# Patient Record
Sex: MALE | Race: WHITE | NOT HISPANIC OR LATINO | Employment: FULL TIME | URBAN - METROPOLITAN AREA
[De-identification: names, ages, dates, MRNs, and addresses within clinical notes are randomized per-mention and may not be internally consistent; named-entity substitution may affect disease eponyms.]

---

## 2022-05-13 ENCOUNTER — HOSPITAL ENCOUNTER (EMERGENCY)
Facility: HOSPITAL | Age: 22
Discharge: HOME | End: 2022-05-13
Attending: EMERGENCY MEDICINE
Payer: COMMERCIAL

## 2022-05-13 VITALS
RESPIRATION RATE: 16 BRPM | HEART RATE: 90 BPM | OXYGEN SATURATION: 99 % | DIASTOLIC BLOOD PRESSURE: 89 MMHG | WEIGHT: 188 LBS | TEMPERATURE: 99.1 F | SYSTOLIC BLOOD PRESSURE: 154 MMHG
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
## 2022-05-13 DIAGNOSIS — S71.151A DOG BITE OF RIGHT THIGH, INITIAL ENCOUNTER: Primary | ICD-10-CM

## 2022-05-13 PROCEDURE — 90471 IMMUNIZATION ADMIN: CPT

## 2022-05-13 PROCEDURE — 99283 EMERGENCY DEPT VISIT LOW MDM: CPT

## 2022-05-13 PROCEDURE — 96372 THER/PROPH/DIAG INJ SC/IM: CPT

## 2022-05-13 PROCEDURE — 90675 RABIES VACCINE IM: CPT | Performed by: PHYSICIAN ASSISTANT

## 2022-05-13 PROCEDURE — 90375 RABIES IG IM/SC: CPT | Performed by: PHYSICIAN ASSISTANT

## 2022-05-13 PROCEDURE — 99284 EMERGENCY DEPT VISIT MOD MDM: CPT | Performed by: PHYSICIAN ASSISTANT

## 2022-05-13 RX ADMIN — RABIES VIRUS STRAIN PM-1503-3M ANTIGEN (PROPIOLACTONE INACTIVATED) AND WATER 1 ML: KIT at 21:40

## 2022-05-13 RX ADMIN — RABIES IMMUNE GLOBULIN (HUMAN) 1710 UNITS: 300 INJECTION, SOLUTION INFILTRATION; INTRAMUSCULAR at 21:33

## 2022-05-14 NOTE — DISCHARGE INSTRUCTIONS
You will need to return to the ED to complete your rabies vaccines  Your rabies vaccine series dates:    Vaccine #2 (day 3): Monday, 5/16/22  Vaccine #3 (day 7): Friday, 5/20/22  Vaccine #4 (day 14): Friday, 5/27/22    Continue taking the antibiotics prescribed at urgent care  Return to the ER sooner with any signs of infection or fevers

## 2022-05-14 NOTE — ED PROVIDER NOTES
History  Chief Complaint   Patient presents with    Dog Bite     Pt states dog bite to R thigh from [place] yesterday; pt seen at urgent care told to come here to be treated for rabies  Bruising noted around the bite     19yo male with no significant past medical history presenting for evaluation of a dog bite that was sustained yesterday  He was picking up a car part in [place] at a [employer]  While picking this up, the [employer]	[place] at the property ran up and bit him in his right thigh  Patient was seen at urgent care after the incident and the wound was irrigated and cleaned with iodine  He reports that a dog bite form was filled out  He was started on antibiotics and advised to go to the ED for a rabies vaccine  Patient is unsure of the dog's vaccination status and does not know the owner of the dog and states the owner seemed "sketchy " He is otherwise asymptomatic  History provided by:  Patient   used: No    Dog Bite  Contact animal:  Dog  Location:  Leg  Leg injury location:  R upper leg  Time since incident:  1 day  Pain details:     Quality:  Aching    Severity:  Mild    Timing:  Constant    Progression:  Unchanged  Incident location:  Another residence  Animal's rabies vaccination status:  Unknown  Animal captured: Unknown  Tetanus status:  Unknown  Relieved by:  Nothing  Worsened by:  Nothing  Associated symptoms: no fever, no numbness and no rash        None       History reviewed  No pertinent past medical history  History reviewed  No pertinent surgical history  History reviewed  No pertinent family history  I have reviewed and agree with the history as documented  E-Cigarette/Vaping     E-Cigarette/Vaping Substances     Social History     Tobacco Use    Smoking status: Never Smoker    Smokeless tobacco: Never Used       Review of Systems   Constitutional: Negative for chills and fever  Eyes: Negative for discharge and redness     Musculoskeletal: Positive for myalgias  Negative for neck stiffness  Skin: Positive for wound  Negative for rash  Neurological: Negative for weakness and numbness  Psychiatric/Behavioral: Negative for confusion  The patient is not nervous/anxious  All other systems reviewed and are negative  Physical Exam  Physical Exam  Vitals and nursing note reviewed  Constitutional:       General: He is not in acute distress  Appearance: Normal appearance  He is not toxic-appearing  HENT:      Head: Normocephalic and atraumatic  Right Ear: External ear normal       Left Ear: External ear normal    Eyes:      General: No scleral icterus  Right eye: No discharge  Left eye: No discharge  Conjunctiva/sclera: Conjunctivae normal    Cardiovascular:      Rate and Rhythm: Normal rate  Pulmonary:      Effort: Pulmonary effort is normal  No respiratory distress  Breath sounds: No stridor  Musculoskeletal:         General: No deformity  Normal range of motion  Cervical back: Normal range of motion  Legs:    Skin:     General: Skin is warm and dry  Neurological:      General: No focal deficit present  Mental Status: He is alert  Mental status is at baseline     Psychiatric:         Mood and Affect: Mood normal          Behavior: Behavior normal          Vital Signs  ED Triage Vitals   Temperature Pulse Respirations Blood Pressure SpO2   05/13/22 2026 05/13/22 2025 05/13/22 2025 05/13/22 2025 05/13/22 2025   99 1 °F (37 3 °C) 90 16 154/89 99 %      Temp Source Heart Rate Source Patient Position - Orthostatic VS BP Location FiO2 (%)   05/13/22 2026 05/13/22 2025 05/13/22 2025 05/13/22 2025 --   Oral Monitor Sitting Left arm       Pain Score       05/13/22 2025       1           Vitals:    05/13/22 2025   BP: 154/89   Pulse: 90   Patient Position - Orthostatic VS: Sitting         Visual Acuity      ED Medications  Medications   rabies vaccine, human diploid (IMOVAX RABIES) IM injection 1 mL (1 mL Intramuscular Given 5/13/22 2140)   rabies immune globulin, human (HyperRAB) injection 1,710 Units (1,710 Units Infiltration Given 5/13/22 2133)       Diagnostic Studies  Results Reviewed     None                 No orders to display              Procedures  Procedures         ED Course                   SBIRT 20yo+    Flowsheet Row Most Recent Value   SBIRT (23 yo +)    In order to provide better care to our patients, we are screening all of our patients for alcohol and drug use  Would it be okay to ask you these screening questions? No Filed at: 05/13/2022 2033                    MDM  Number of Diagnoses or Management Options  Dog bite of right thigh, initial encounter: new and does not require workup  Diagnosis management comments: 19yo male presenting for a rabies vaccine  He sustained a dog bite yesterday to the right thigh  Unknown vaccination status of the dog and patient does not know the owner  Seen at urgent care yesterday and started on abx  Told to go to the ED for a rabies vaccine  Wound appears clean on exam without signs of infection  Since the address of the dog is known, offered to fax a dog bite form to the health department so that the  can follow up with the dog to see if the rabies vaccine is necessary  Patient prefers to start the rabies vaccine series today  He was given the rabies immunoglobulin and vaccine  He is also unsure of his tetanus status but is declining a tetanus vaccine today and prefers to call his PCP next week to obtain his vaccine records  He was advised to return to the ED on days 3, 7, and 14 and to continue his antibiotics  ED return precautions discussed  Patient expressed understanding and is agreeable to plan  Patient discharged in stable condition        Risk of Complications, Morbidity, and/or Mortality  Presenting problems: low  Diagnostic procedures: low  Management options: low    Patient Progress  Patient progress: stable      Disposition  Final diagnoses:   Dog bite of right thigh, initial encounter     Time reflects when diagnosis was documented in both MDM as applicable and the Disposition within this note     Time User Action Codes Description Comment    5/13/2022  8:59 PM [address] [place], [place] [address]Abdi urbina  0XXA] Dog bite of right thigh, initial encounter       ED Disposition     ED Disposition   Discharge    Condition   Stable    Date/Time   Fri May 13, 2022  8:59 PM    Comment   Sumanth Chapin discharge to home/self care  Follow-up Information     Follow up With Specialties Details Why Contact Info Additional Information    [address] [facility] Emergency Department Emergency Medicine In 3 days  [address] [facility] [id number] [facility] Emergency Department, [address], [place], [address]          There are no discharge medications for this patient  No discharge procedures on file      PDMP Review     None          ED Provider  Electronically Signed by           Antoine Patterson PA-C  05/14/22 6868

## 2022-05-16 ENCOUNTER — HOSPITAL ENCOUNTER (EMERGENCY)
Facility: HOSPITAL | Age: 22
Discharge: HOME | End: 2022-05-16
Attending: EMERGENCY MEDICINE | Admitting: EMERGENCY MEDICINE
Payer: COMMERCIAL

## 2022-05-16 VITALS
SYSTOLIC BLOOD PRESSURE: 146 MMHG | HEIGHT: 72 IN | BODY MASS INDEX: 25.47 KG/M2 | WEIGHT: 188 LBS | DIASTOLIC BLOOD PRESSURE: 77 MMHG | TEMPERATURE: 98 F | HEART RATE: 84 BPM | RESPIRATION RATE: 18 BRPM | OXYGEN SATURATION: 98 %

## 2022-05-16 DIAGNOSIS — Z23 ENCOUNTER FOR REPEAT ADMINISTRATION OF RABIES VACCINATION: Primary | ICD-10-CM

## 2022-05-16 PROCEDURE — 99282 EMERGENCY DEPT VISIT SF MDM: CPT | Performed by: EMERGENCY MEDICINE

## 2022-05-16 PROCEDURE — 90471 IMMUNIZATION ADMIN: CPT

## 2022-05-16 PROCEDURE — 90675 RABIES VACCINE IM: CPT | Performed by: EMERGENCY MEDICINE

## 2022-05-16 RX ADMIN — RABIES VIRUS STRAIN PM-1503-3M ANTIGEN (PROPIOLACTONE INACTIVATED) AND WATER 1 ML: KIT at 21:32

## 2022-05-17 NOTE — ED PROVIDER NOTES
History  Chief Complaint   Patient presents with    Follow Up Rabies     Here for shot 2     Patient is a 43-year-old male with no significant past medical or surgical history, presents to the emergency department for his 2nd rabies vaccination  Patient was bitten by a dog in [place] when picking up by car part on 05/12  He was bitten in the right thigh  The dog's rabies vaccination status was unknown so it was recommended that he be started on the rabies vaccination series  Patient went to urgent care on 05/12 and started on Augmentin which she continues to take  He was seen here on 05/13 and had his rabies immunoglobulin as well as his 1st rabies vaccination  He returns today for his 2nd shot  He has been changing his wound dressings daily on his right thigh  Denies any significant pain to the thigh, surrounding redness or warmth  Denies drainage of pus, fevers, chills  He reports bruising to the right thigh  He denies any complications from the prior rabies vaccination  Patient did check and he is up-to-date with tetanus, last shot was October of 2021  He denies any other complaints at this time and rest of medical review of systems is negative  History provided by:  Patient and medical records   used: No        None       History reviewed  No pertinent past medical history  History reviewed  No pertinent surgical history  History reviewed  No pertinent family history  I have reviewed and agree with the history as documented  E-Cigarette/Vaping     E-Cigarette/Vaping Substances     Social History     Tobacco Use    Smoking status: Never Smoker    Smokeless tobacco: Never Used       Review of Systems   Constitutional: Negative for chills and fever  HENT: Negative for congestion, ear pain, rhinorrhea and sore throat  Respiratory: Negative for cough and shortness of breath  Cardiovascular: Negative for chest pain and palpitations     Gastrointestinal: Negative for abdominal pain, diarrhea, nausea and vomiting  Musculoskeletal: Negative for back pain, gait problem, neck pain and neck stiffness  Skin: Positive for wound  Negative for color change, pallor and rash  Allergic/Immunologic: Negative for immunocompromised state  Neurological: Negative for dizziness, weakness, light-headedness, numbness and headaches  Hematological: Negative for adenopathy  Does not bruise/bleed easily  Psychiatric/Behavioral: Negative for confusion and decreased concentration  All other systems reviewed and are negative  Physical Exam  Physical Exam  Vitals and nursing note reviewed  Constitutional:       General: He is not in acute distress  Appearance: Normal appearance  He is well-developed  He is not ill-appearing, toxic-appearing or diaphoretic  HENT:      Head: Normocephalic and atraumatic  Right Ear: External ear normal       Left Ear: External ear normal       Nose: Nose normal       Mouth/Throat:      Comments: Orpharyngeal exam deferred at this time due to risk of exposure to COVID-19 during current pandemic  Patient has no oropharyngeal complaints  Eyes:      Extraocular Movements: Extraocular movements intact  Conjunctiva/sclera: Conjunctivae normal    Neck:      Vascular: No JVD  Cardiovascular:      Rate and Rhythm: Normal rate and regular rhythm  Pulses: Normal pulses  Heart sounds: Normal heart sounds  No murmur heard  No friction rub  No gallop  Pulmonary:      Effort: Pulmonary effort is normal  No respiratory distress  Breath sounds: Normal breath sounds  No wheezing, rhonchi or rales  Abdominal:      General: There is no distension  Palpations: Abdomen is soft  Tenderness: There is no abdominal tenderness  There is no guarding or rebound  Musculoskeletal:         General: No swelling or deformity  Normal range of motion  Cervical back: Normal range of motion and neck supple  No rigidity  Comments: Compartments of the right thigh are soft and compressible  Skin:     General: Skin is warm and dry  Coloration: Skin is not pale  Findings: No erythema or rash  Comments: Right medial and distal thigh has ecchymosis as well as superficial lacerations, healing well without any surrounding erythema or warmth  Neurological:      General: No focal deficit present  Mental Status: He is alert and oriented to person, place, and time  Sensory: No sensory deficit  Motor: No weakness  Psychiatric:         Mood and Affect: Mood normal          Behavior: Behavior normal          Vital Signs  ED Triage Vitals [05/16/22 2108]   Temperature Pulse Respirations Blood Pressure SpO2   98 °F (36 7 °C) 84 18 146/77 98 %      Temp Source Heart Rate Source Patient Position - Orthostatic VS BP Location FiO2 (%)   Tympanic Monitor Sitting Left arm --      Pain Score       --         Vitals:    05/16/22 2108   BP: 146/77   BP Location: Left arm   Pulse: 84   Resp: 18   Temp: 98 °F (36 7 °C)   TempSrc: Tympanic   SpO2: 98%   Weight: 85 3 kg (188 lb)   Height: 6' (1 829 m)       Visual Acuity      ED Medications  Medications   rabies vaccine, human diploid (IMOVAX RABIES) IM injection 1 mL (has no administration in time range)       Diagnostic Studies  Results Reviewed     None                 No orders to display              Procedures  Procedures         ED Course                                             MDM  Number of Diagnoses or Management Options  Diagnosis management comments: 28-year-old male presents to the ED for his 2nd rabies vaccination  He was bitten in the right thigh by a unknown dog  Tetanus is up-to-date  Patient taking Augmentin and I advised him to continue until the entire course of antibiotics is completed  Discussed wound care at home and signs and symptoms of wound infection and to return immediately if these symptoms develop    Will give 2nd rabies vaccination and advised him to return on day 7 and day 14 for his 3rd and 4th/final vaccination  Amount and/or Complexity of Data Reviewed  Review and summarize past medical records: yes        Disposition  Final diagnoses:   Encounter for repeat administration of rabies vaccination     Time reflects when diagnosis was documented in both MDM as applicable and the Disposition within this note     Time User Action Codes Description Comment    5/16/2022  9:18 PM Laz MERRILL Add [Z23] Encounter for repeat administration of rabies vaccination       ED Disposition     ED Disposition   Discharge    Condition   Stable    Date/Time   Mon May 16, 2022  9:18 PM    Comment   [address] discharge to home/self care  Follow-up Information     Follow up With Specialties Details Why [facility] Emergency Department Emergency Medicine Go to  on day 7 (5/20) and day 14 (5/27) for your 3rd and 4th rabies vaccination  Return immediately if signs of wound infection develop [address] [place] [id number] [facility] Emergency Department, [address], [place], [address]          Patient's Medications    No medications on file       No discharge procedures on file      PDMP Review     None          ED Provider  Electronically Signed by           Preston Morel DO  05/16/22 2175

## 2022-05-20 ENCOUNTER — APPOINTMENT (EMERGENCY)
Dept: RADIOLOGY | Facility: HOSPITAL | Age: 22
End: 2022-05-20
Payer: COMMERCIAL

## 2022-05-20 ENCOUNTER — HOSPITAL ENCOUNTER (EMERGENCY)
Facility: HOSPITAL | Age: 22
Discharge: HOME | End: 2022-05-20
Attending: EMERGENCY MEDICINE | Admitting: EMERGENCY MEDICINE
Payer: COMMERCIAL

## 2022-05-20 VITALS
DIASTOLIC BLOOD PRESSURE: 76 MMHG | TEMPERATURE: 99.8 F | HEART RATE: 74 BPM | SYSTOLIC BLOOD PRESSURE: 152 MMHG | OXYGEN SATURATION: 98 % | RESPIRATION RATE: 18 BRPM

## 2022-05-20 DIAGNOSIS — F41.9 ANXIETY: ICD-10-CM

## 2022-05-20 DIAGNOSIS — R07.89 CHEST TIGHTNESS: ICD-10-CM

## 2022-05-20 DIAGNOSIS — Z23 ENCOUNTER FOR REPEAT ADMINISTRATION OF RABIES VACCINATION: Primary | ICD-10-CM

## 2022-05-20 PROCEDURE — 71046 X-RAY EXAM CHEST 2 VIEWS: CPT

## 2022-05-20 PROCEDURE — 90675 RABIES VACCINE IM: CPT | Performed by: EMERGENCY MEDICINE

## 2022-05-20 PROCEDURE — 90471 IMMUNIZATION ADMIN: CPT

## 2022-05-20 PROCEDURE — 93005 ELECTROCARDIOGRAM TRACING: CPT

## 2022-05-20 PROCEDURE — 99282 EMERGENCY DEPT VISIT SF MDM: CPT | Performed by: EMERGENCY MEDICINE

## 2022-05-20 RX ADMIN — RABIES VIRUS STRAIN PM-1503-3M ANTIGEN (PROPIOLACTONE INACTIVATED) AND WATER 1 ML: KIT at 18:27

## 2022-05-20 NOTE — ED PROVIDER NOTES
History  Chief Complaint   Patient presents with    Follow Up Rabies     Pt reports he needs a follow up rabies shot  Patient is a 78-year-old male with no significant past medical or surgical history, presents to the emergency department for his 3rd rabies vaccination  Patient was bitten by a dog in [address] [place] [address] when picking up by car part on 05/12  He was bitten in the right thigh  The dog's rabies vaccination status was unknown so it was recommended that he be started on the rabies vaccination series  Patient went to urgent care on 05/12 and started on Augmentin which he has 2-3 days left of  He was seen here on 05/13 and had his rabies immunoglobulin as well as his 1st rabies vaccination  He was seen for his 2nd rabies vaccine on 5/16 and returns today for his 3rd shot  Denies any significant pain to the thigh, surrounding redness or warmth  Denies drainage of pus, fevers, chills  He reports bruising to the right thigh  He denies any complications from the prior rabies vaccination  Patient is up-to-date with tetanus, last shot was October of 2021  On ROS he does admit to intermittent left sided chest tightness since yesterday and states he thinks it might be stress or anxiety related because he had a panic attack 2 days ago  He states he has had chest tightness related to anxiety in the past   He denies any worsening with exertion and states it comes on randomly  He denies dyspnea, palpitations, diaphoresis, cough, hemoptysis, URI symptoms, headache, dizziness/near syncope, abdominal pain, n/v/d/c, extremity weakness/paresthesia  History provided by:  Patient and medical records   used: No        None       History reviewed  No pertinent past medical history  History reviewed  No pertinent surgical history  History reviewed  No pertinent family history  I have reviewed and agree with the history as documented      E-Cigarette/Vaping     E-Cigarette/Vaping Substances     Social History     Tobacco Use    Smoking status: Never Smoker    Smokeless tobacco: Never Used       Review of Systems   Constitutional: Negative for chills, diaphoresis and fever  HENT: Negative for congestion, ear pain, rhinorrhea and sore throat  Respiratory: Positive for chest tightness  Negative for cough, shortness of breath and wheezing  Cardiovascular: Negative for chest pain, palpitations and leg swelling  Gastrointestinal: Negative for abdominal pain, diarrhea, nausea and vomiting  Musculoskeletal: Negative for back pain, neck pain and neck stiffness  Skin: Negative for color change, pallor and rash  Allergic/Immunologic: Negative for immunocompromised state  Neurological: Negative for dizziness, weakness, light-headedness, numbness and headaches  Hematological: Negative for adenopathy  Psychiatric/Behavioral: Negative for confusion and decreased concentration  The patient is nervous/anxious  All other systems reviewed and are negative  Physical Exam  Physical Exam  Vitals and nursing note reviewed  Constitutional:       General: He is not in acute distress  Appearance: Normal appearance  He is well-developed  He is not ill-appearing, toxic-appearing or diaphoretic  HENT:      Head: Normocephalic and atraumatic  Right Ear: External ear normal       Left Ear: External ear normal       Mouth/Throat:      Comments: Orpharyngeal exam deferred at this time due to risk of exposure to COVID-19 during current pandemic  Patient has no oropharyngeal complaints  Eyes:      Extraocular Movements: Extraocular movements intact  Conjunctiva/sclera: Conjunctivae normal    Neck:      Vascular: No JVD  Cardiovascular:      Rate and Rhythm: Normal rate and regular rhythm  Pulses: Normal pulses  Heart sounds: Normal heart sounds  No murmur heard  No friction rub  No gallop     Pulmonary:      Effort: Pulmonary effort is normal  No respiratory distress  Breath sounds: Normal breath sounds  No wheezing, rhonchi or rales  Abdominal:      General: There is no distension  Palpations: Abdomen is soft  Tenderness: There is no abdominal tenderness  There is no guarding or rebound  Musculoskeletal:         General: No swelling or deformity  Normal range of motion  Cervical back: Normal range of motion and neck supple  No rigidity  Comments: Right thigh ecchymosis medial and distally is green/yellow in color  Compartments of the thigh soft and compressible  Two small superficial wounds over the right medial thigh appear to be healing well without any surrounding signs of infection  Skin:     General: Skin is warm and dry  Coloration: Skin is not pale  Findings: No erythema or rash  Neurological:      General: No focal deficit present  Mental Status: He is alert and oriented to person, place, and time  Sensory: No sensory deficit  Motor: No weakness     Psychiatric:         Mood and Affect: Mood normal          Behavior: Behavior normal          Vital Signs  ED Triage Vitals [05/20/22 1743]   Temperature Pulse Respirations Blood Pressure SpO2   99 8 °F (37 7 °C) 74 18 152/76 98 %      Temp Source Heart Rate Source Patient Position - Orthostatic VS BP Location FiO2 (%)   Tympanic Monitor Sitting Left arm --      Pain Score       --         Vitals:    05/20/22 1743   BP: 152/76   BP Location: Left arm   Pulse: 74   Resp: 18   Temp: 99 8 °F (37 7 °C)   TempSrc: Tympanic   SpO2: 98%       Visual Acuity      ED Medications  Medications   rabies vaccine, human diploid (IMOVAX RABIES) IM injection 1 mL (has no administration in time range)       Diagnostic Studies  Results Reviewed     None                 XR chest 2 views   ED Interpretation by Ifeanyi Hinton DO (05/20 1822)   No acute abnormality in the chest                  Procedures  ECG 12 Lead Documentation Only    Date/Time: 5/20/2022 6:14 PM  Performed by: Tank Santa DO  Authorized by: Tank Santa DO     ECG reviewed by me, the ED Provider: yes    Patient location:  ED  Previous ECG:     Previous ECG:  Unavailable  Interpretation:     Interpretation: normal    Rate:     ECG rate:  86    ECG rate assessment: normal    Rhythm:     Rhythm: sinus rhythm    Ectopy:     Ectopy: none    QRS:     QRS axis:  Normal    QRS intervals:  Normal  Conduction:     Conduction: normal    ST segments:     ST segments:  Normal  T waves:     T waves: normal               ED Course  ED Course as of 05/20/22 1823   Fri May 20, 2022   2935 Discussed normal EKG and chest x-ray with patient  Most likely this is stress or anxiety related  I advised him to follow-up with his PCP and return if any symptoms worsen  If no complications, I advised return on 05/27 for his 4th and final rabies vaccination  MDM  Number of Diagnoses or Management Options  Diagnosis management comments: 43-year-old male presents to the ED for his 3rd rabies vaccination after he was bitten by a dog on 05/12  Patient denies any complications with the wound and finished Augmentin  He does complain of acute chest tightness, intermittent since yesterday  Most likely this is related to stress or anxiety given associated panic attack 2 days ago  Will obtain EKG and chest x-ray  Will give 3rd rabies vaccination and instruct him to return in 1 week for his 4th and final vaccination         Amount and/or Complexity of Data Reviewed  Tests in the radiology section of CPT®: ordered and reviewed  Tests in the medicine section of CPT®: reviewed and ordered  Independent visualization of images, tracings, or specimens: yes        Disposition  Final diagnoses:   Encounter for repeat administration of rabies vaccination   Chest tightness   Anxiety     Time reflects when diagnosis was documented in both MDM as applicable and the Disposition within this note Time User Action Codes Description Comment    5/20/2022  6:19 PM Guanako MERRILL Add [Z23] Encounter for repeat administration of rabies vaccination     5/20/2022  6:19 PM Tanya Arthur Add [R07 89] Chest tightness     5/20/2022  6:19 PM Tanya Arthur Add [F41 9] Anxiety       ED Disposition     ED Disposition   Discharge    Condition   Stable    Date/Time   Fri May 20, 2022  6:19 PM    Comment   Nikolas Chapin discharge to home/self care  Follow-up Information     Follow up With Specialties Details Why Contact Info Additional 65 [facility],  Internal Medicine Schedule an appointment as soon as possible for a visit  As needed [address] Emergency Department Emergency Medicine Go to  on 5/27 for your 4th and final rabies vaccine  Return immediately if any symptoms worsen or signs of wound infection develop  [address] [facility] [phone] [facility] Emergency Department, [address], [place], [address]          Patient's Medications    No medications on file       No discharge procedures on file      PDMP Review     None          ED Provider  Electronically Signed by           Omar Arnold DO  05/20/22 0883

## 2022-05-21 LAB
ATRIAL RATE: 86 BPM
P AXIS: 66 DEGREES
PR INTERVAL: 138 MS
QRS AXIS: 76 DEGREES
QRSD INTERVAL: 78 MS
QT INTERVAL: 338 MS
QTC INTERVAL: 404 MS
T WAVE AXIS: 18 DEGREES
VENTRICULAR RATE: 86 BPM

## 2022-05-21 PROCEDURE — 93010 ELECTROCARDIOGRAM REPORT: CPT | Performed by: INTERNAL MEDICINE

## 2022-05-27 ENCOUNTER — HOSPITAL ENCOUNTER (EMERGENCY)
Facility: HOSPITAL | Age: 22
Discharge: HOME | End: 2022-05-27
Attending: EMERGENCY MEDICINE
Payer: COMMERCIAL

## 2022-05-27 VITALS
RESPIRATION RATE: 18 BRPM | OXYGEN SATURATION: 97 % | DIASTOLIC BLOOD PRESSURE: 78 MMHG | SYSTOLIC BLOOD PRESSURE: 142 MMHG | TEMPERATURE: 98.7 F | HEART RATE: 101 BPM

## 2022-05-27 DIAGNOSIS — Z23 ENCOUNTER FOR REPEAT ADMINISTRATION OF RABIES VACCINATION: Primary | ICD-10-CM

## 2022-05-27 PROCEDURE — 99282 EMERGENCY DEPT VISIT SF MDM: CPT | Performed by: EMERGENCY MEDICINE

## 2022-05-27 PROCEDURE — 90675 RABIES VACCINE IM: CPT | Performed by: EMERGENCY MEDICINE

## 2022-05-27 PROCEDURE — 90471 IMMUNIZATION ADMIN: CPT

## 2022-05-27 RX ADMIN — Medication 1 ML: at 17:28

## 2022-05-27 NOTE — ED PROVIDER NOTES
History  Chief Complaint   Patient presents with    Follow Up Rabies     Pt reports being here for his last rabies shot  HPI  18 yo F presenting for fourth rabies vaccination  He was bitten by an unknown dog and was seen in the ED for this on 5/13  Wound on R thigh is healing  Patient feels well  Cannot display prior to admission medications because the patient has not been admitted in this contact  History reviewed  No pertinent past medical history  History reviewed  No pertinent surgical history  History reviewed  No pertinent family history  I have reviewed and agree with the history as documented  E-Cigarette/Vaping     E-Cigarette/Vaping Substances     Social History     Tobacco Use    Smoking status: Never Smoker    Smokeless tobacco: Never Used       Review of Systems   Constitutional: Negative for chills and fever  HENT: Negative for dental problem and ear pain  Eyes: Negative for pain and redness  Respiratory: Negative for cough and shortness of breath  Cardiovascular: Negative for chest pain and palpitations  Gastrointestinal: Negative for abdominal pain and nausea  Endocrine: Negative for polydipsia and polyphagia  Genitourinary: Negative for dysuria and frequency  Musculoskeletal: Negative for arthralgias and joint swelling  Skin: Negative for color change and rash  Neurological: Negative for dizziness and headaches  Psychiatric/Behavioral: Negative for behavioral problems and confusion  All other systems reviewed and are negative  Physical Exam  Physical Exam  Vitals and nursing note reviewed  Constitutional:       General: He is not in acute distress  HENT:      Head: Normocephalic and atraumatic  Right Ear: External ear normal       Left Ear: External ear normal       Nose: Nose normal    Eyes:      General: No scleral icterus  Cardiovascular:      Rate and Rhythm: Normal rate     Pulmonary:      Effort: Pulmonary effort is normal  No respiratory distress  Abdominal:      General: There is no distension  Musculoskeletal:         General: No deformity  Normal range of motion  Comments: R thigh wound healing   Skin:     Findings: No rash  Neurological:      General: No focal deficit present  Mental Status: He is alert  Gait: Gait normal    Psychiatric:         Mood and Affect: Mood normal          Vital Signs  ED Triage Vitals [05/27/22 1723]   Temperature Pulse Respirations Blood Pressure SpO2   98 7 °F (37 1 °C) 101 18 142/78 97 %      Temp Source Heart Rate Source Patient Position - Orthostatic VS BP Location FiO2 (%)   Tympanic Monitor Sitting Left arm --      Pain Score       --           Vitals:    05/27/22 1723   BP: 142/78   Pulse: 101   Patient Position - Orthostatic VS: Sitting         Visual Acuity      ED Medications  Medications   rabies vaccine, human diploid (IMOVAX RABIES) IM injection 1 mL (has no administration in time range)       Diagnostic Studies  Results Reviewed     None                 No orders to display              Procedures  Procedures         ED Course                                             MDM  Patient presents for final rabies vaccination  No complaints  Disposition  Final diagnoses:   Encounter for repeat administration of rabies vaccination     Time reflects when diagnosis was documented in both MDM as applicable and the Disposition within this note     Time User Action Codes Description Comment    5/27/2022  5:24 PM Mamie Garcia [Z23] Encounter for repeat administration of rabies vaccination       ED Disposition     ED Disposition   Discharge    Condition   Stable    Date/Time   Fri May 27, 2022  5:24 PM    Comment   [address] discharge to home/self care                 Follow-up Information     Follow up With Specialties Details Why Contact Aj Whitney, DO Internal Medicine  As needed [address], [place] [address]  [phone]            Patient's Medications    No medications on file       No discharge procedures on file      PDMP Review     None          ED Provider  Electronically Signed by           Bradly Falcon MD  05/27/22 9341

## 2024-04-10 ENCOUNTER — APPOINTMENT (OUTPATIENT)
Dept: URGENT CARE | Facility: MEDICAL CENTER | Age: 24
End: 2024-04-10

## 2024-11-19 ENCOUNTER — OFFICE VISIT (OUTPATIENT)
Age: 24
End: 2024-11-19
Payer: OTHER MISCELLANEOUS

## 2024-11-19 ENCOUNTER — APPOINTMENT (OUTPATIENT)
Age: 24
End: 2024-11-19
Payer: OTHER MISCELLANEOUS

## 2024-11-19 ENCOUNTER — OCCMED (OUTPATIENT)
Age: 24
End: 2024-11-19
Payer: OTHER MISCELLANEOUS

## 2024-11-19 VITALS
BODY MASS INDEX: 27.98 KG/M2 | HEART RATE: 69 BPM | SYSTOLIC BLOOD PRESSURE: 124 MMHG | TEMPERATURE: 97.8 F | OXYGEN SATURATION: 97 % | RESPIRATION RATE: 18 BRPM | HEIGHT: 74 IN | WEIGHT: 218 LBS | DIASTOLIC BLOOD PRESSURE: 78 MMHG

## 2024-11-19 DIAGNOSIS — S99.922A FOOT INJURY, LEFT, INITIAL ENCOUNTER: ICD-10-CM

## 2024-11-19 DIAGNOSIS — S99.922A FOOT INJURY, LEFT, INITIAL ENCOUNTER: Primary | ICD-10-CM

## 2024-11-19 DIAGNOSIS — J06.9 VIRAL URI WITH COUGH: Primary | ICD-10-CM

## 2024-11-19 PROCEDURE — 99213 OFFICE O/P EST LOW 20 MIN: CPT | Performed by: PHYSICIAN ASSISTANT

## 2024-11-19 PROCEDURE — 73630 X-RAY EXAM OF FOOT: CPT

## 2024-11-19 RX ORDER — BROMPHENIRAMINE MALEATE, PSEUDOEPHEDRINE HYDROCHLORIDE, AND DEXTROMETHORPHAN HYDROBROMIDE 2; 30; 10 MG/5ML; MG/5ML; MG/5ML
10 SYRUP ORAL 4 TIMES DAILY PRN
Qty: 120 ML | Refills: 0 | Status: SHIPPED | OUTPATIENT
Start: 2024-11-19

## 2024-11-19 NOTE — PROGRESS NOTES
[facility]        NAME: Nico Chapin is a 24 y.o. male  : 2000    MRN: [id number]  DATE: 2024  TIME: 10:48 AM    Assessment and Plan   Viral URI with cough [J06.9]  1. Viral URI with cough  brompheniramine-pseudoephedrine-DM 30-2-10 MG/5ML syrup        Follow-up in 3 weeks if no better with PCP sooner if worsening or ER if much worse specifically for any chest pain shortness of breath      The patient and/or parent/legal guardian verbalized understanding of exam findings and   Treatment plan. We engaged in the shared decision-making process and treatment options were   discussed at length with the patient.  All questions, concerns and complaints were answered and   addressed to the patient's' and/or parent/legal guardians's satisfaction.    Patient Instructions   There are no Patient Instructions on file for this visit.    Follow up with PCP in 3-5 days.  Proceed to  ER if symptoms worsen.    If tests are performed, our office will contact you with results only if   changes need to made to the care plan discussed with you at the visit.   You can review your full results on [facility].     Chief Complaint     Chief Complaint   Patient presents with    Cough     Pt c/o productive cough for 1 week. OTC meds: nyquil          History of Present Illness       HPI  Patient complains of productive cough for 1 week. Mucus is clear or greenish color. No earache or sore throat, occasional nasal congestion. No fever or chills. No CP or SOB.     Review of Systems   Review of Systems  All other related systems reviewed and are negative except as noted in HPI    Current Medications       Current Outpatient Medications:     brompheniramine-pseudoephedrine-DM 30-2-10 MG/5ML syrup, Take 10 mL by mouth 4 (four) times a day as needed for congestion or cough, Disp: 120 mL, Rfl: 0    Current Allergies     Allergies as of 2024    (No Known Allergies)            The following portions of the  "patient's history were reviewed and updated as appropriate: allergies, current medications, past family history, past medical history, past social history, past surgical history and problem list.     History reviewed. No pertinent past medical history.    History reviewed. No pertinent surgical history.    History reviewed. No pertinent family history.      Medications have been verified.        Objective   /78   Pulse 69   Temp 97.8 °F (36.6 °C)   Resp 18   Ht 6' 2\" (1.88 m)   Wt 98.9 kg (218 lb)   SpO2 97%   BMI 27.99 kg/m²   No LMP for male patient.       Physical Exam     Physical Exam  Constitutional:       General: He is not in acute distress.     Appearance: He is well-developed.   HENT:      Head: Normocephalic and atraumatic.   Eyes:      General: No scleral icterus.     Conjunctiva/sclera: Conjunctivae normal.   Cardiovascular:      Rate and Rhythm: Normal rate and regular rhythm.      Heart sounds: Normal heart sounds. No murmur heard.  Pulmonary:      Effort: Pulmonary effort is normal. No respiratory distress.      Breath sounds: No stridor. No wheezing, rhonchi or rales.   Musculoskeletal:      Cervical back: Normal range of motion and neck supple.   Skin:     General: Skin is warm and dry.   Neurological:      Mental Status: He is alert and oriented to person, place, and time.   Psychiatric:         Behavior: Behavior normal.         Ortho Exam        Procedures  No Procedures performed today        Note: Portions of this record may have been created with voice recognition software. Occasional wrong word or \"sound a like\" substitutions may have occurred due to the inherent limitations of voice recognition software. Please read the chart carefully and recognize, using context, where substitutions have occurred.*      "

## 2025-02-15 ENCOUNTER — APPOINTMENT (OUTPATIENT)
Dept: URGENT CARE | Facility: CLINIC | Age: 25
End: 2025-02-15
Payer: OTHER GOVERNMENT

## 2025-02-15 ENCOUNTER — OFFICE VISIT (OUTPATIENT)
Dept: URGENT CARE | Facility: CLINIC | Age: 25
End: 2025-02-15

## 2025-02-15 VITALS
TEMPERATURE: 98.6 F | HEART RATE: 84 BPM | SYSTOLIC BLOOD PRESSURE: 116 MMHG | HEIGHT: 72 IN | DIASTOLIC BLOOD PRESSURE: 70 MMHG | RESPIRATION RATE: 16 BRPM | BODY MASS INDEX: 29.12 KG/M2 | WEIGHT: 215 LBS | OXYGEN SATURATION: 98 %

## 2025-02-15 DIAGNOSIS — J06.9 VIRAL URI WITH COUGH: Primary | ICD-10-CM

## 2025-02-15 PROCEDURE — G0463 HOSPITAL OUTPT CLINIC VISIT: HCPCS | Performed by: PHYSICIAN ASSISTANT

## 2025-02-15 PROCEDURE — 99213 OFFICE O/P EST LOW 20 MIN: CPT | Performed by: PHYSICIAN ASSISTANT

## 2025-02-15 RX ORDER — BROMPHENIRAMINE MALEATE, PSEUDOEPHEDRINE HYDROCHLORIDE, AND DEXTROMETHORPHAN HYDROBROMIDE 2; 30; 10 MG/5ML; MG/5ML; MG/5ML
10 SYRUP ORAL 4 TIMES DAILY PRN
Qty: 120 ML | Refills: 0 | Status: SHIPPED | OUTPATIENT
Start: 2025-02-15

## 2025-02-15 NOTE — PROGRESS NOTES
[facility]        NAME: Nico Chapin is a 24 y.o. male  : 2000    MRN: [id number]  DATE: February 15, 2025  TIME: 10:49 AM    Assessment and Plan   Viral URI with cough [J06.9]  1. Viral URI with cough  brompheniramine-pseudoephedrine-DM 30-2-10 MG/5ML syrup            Patient Instructions     Discussed condition with pt. I suspect viral URI for which I prescribed him Bromfed-DM and rec hydration, rest, discussed OTC cough/cold meds, and observation.     Follow up with PCP in 3-5 days.  Proceed to  ER if symptoms worsen.    If tests have been performed at [facility], our office will contact you with results if changes need to be made to the care plan discussed with you at the visit.  [facility]    Chief Complaint     Chief Complaint   Patient presents with    Cold Like Symptoms     Started 3 days ago  Coughing, non productive, and productive, sinus congestion  OTC cold medication          History of Present Illness       Patient presents with 3-day history of nasal/sinus congestion, alternating dry and productive cough.  Denies sore throat, fever, chills, shortness of breath or wheezing, NVD.  Has taken DayQuil and NyQuil.        Review of Systems   Review of Systems   Constitutional: Negative.    HENT:  Positive for congestion. Negative for sore throat.    Respiratory:  Positive for cough. Negative for shortness of breath and wheezing.    Cardiovascular: Negative.    Gastrointestinal: Negative.    Genitourinary: Negative.          Current Medications       Current Outpatient Medications:     brompheniramine-pseudoephedrine-DM 30-2-10 MG/5ML syrup, Take 10 mL by mouth 4 (four) times a day as needed for congestion or cough, Disp: 120 mL, Rfl: 0    Current Allergies     Allergies as of 02/15/2025    (No Known Allergies)            The following portions of the patient's history were reviewed and updated as appropriate: allergies, current medications,  past family history, past medical history, past social history, past surgical history and problem list.     History reviewed. No pertinent past medical history.    History reviewed. No pertinent surgical history.    History reviewed. No pertinent family history.      Medications have been verified.        Objective   /70   Pulse 84   Temp 98.6 °F (37 °C)   Resp 16   Ht 6' (1.829 m)   Wt 97.5 kg (215 lb)   SpO2 98%   BMI 29.16 kg/m²   No LMP for male patient.       Physical Exam     Physical Exam  Vitals reviewed.   Constitutional:       General: He is not in acute distress.     Appearance: He is well-developed.   HENT:      Right Ear: Hearing, tympanic membrane, ear canal and external ear normal.      Left Ear: Hearing, tympanic membrane, ear canal and external ear normal.      Nose: Mucosal edema (B/L boggy turbinates) and congestion present.      Mouth/Throat:      Mouth: Mucous membranes are moist.      Pharynx: Posterior oropharyngeal erythema (PND) present. No oropharyngeal exudate.      Tonsils: No tonsillar exudate.   Cardiovascular:      Rate and Rhythm: Normal rate and regular rhythm.      Heart sounds: Normal heart sounds. No murmur heard.  Pulmonary:      Effort: Pulmonary effort is normal. No respiratory distress.      Breath sounds: Rhonchi (Bilateral diffuse coarse breath sounds heard throughout) present. No wheezing.   Musculoskeletal:      Cervical back: Neck supple.   Lymphadenopathy:      Cervical: No cervical adenopathy.   Neurological:      Mental Status: He is alert and oriented to person, place, and time.